# Patient Record
Sex: MALE | Race: WHITE | NOT HISPANIC OR LATINO | URBAN - METROPOLITAN AREA
[De-identification: names, ages, dates, MRNs, and addresses within clinical notes are randomized per-mention and may not be internally consistent; named-entity substitution may affect disease eponyms.]

---

## 2019-04-11 ENCOUNTER — OUTPATIENT (OUTPATIENT)
Dept: OUTPATIENT SERVICES | Facility: HOSPITAL | Age: 24
LOS: 1 days | End: 2019-04-11

## 2019-04-11 VITALS
SYSTOLIC BLOOD PRESSURE: 90 MMHG | HEART RATE: 64 BPM | HEIGHT: 74 IN | WEIGHT: 179.02 LBS | TEMPERATURE: 98 F | DIASTOLIC BLOOD PRESSURE: 56 MMHG | RESPIRATION RATE: 16 BRPM

## 2019-04-11 DIAGNOSIS — S62.001B: ICD-10-CM

## 2019-04-11 DIAGNOSIS — Z98.890 OTHER SPECIFIED POSTPROCEDURAL STATES: Chronic | ICD-10-CM

## 2019-04-11 DIAGNOSIS — S62.001A UNSPECIFIED FRACTURE OF NAVICULAR [SCAPHOID] BONE OF RIGHT WRIST, INITIAL ENCOUNTER FOR CLOSED FRACTURE: ICD-10-CM

## 2019-04-11 LAB
HCT VFR BLD CALC: 35.9 % — LOW (ref 39–50)
HGB BLD-MCNC: 11 G/DL — LOW (ref 13–17)
MCHC RBC-ENTMCNC: 25.1 PG — LOW (ref 27–34)
MCHC RBC-ENTMCNC: 30.6 % — LOW (ref 32–36)
MCV RBC AUTO: 81.8 FL — SIGNIFICANT CHANGE UP (ref 80–100)
NRBC # FLD: 0 K/UL — SIGNIFICANT CHANGE UP (ref 0–0)
PLATELET # BLD AUTO: 262 K/UL — SIGNIFICANT CHANGE UP (ref 150–400)
PMV BLD: 9.7 FL — SIGNIFICANT CHANGE UP (ref 7–13)
RBC # BLD: 4.39 M/UL — SIGNIFICANT CHANGE UP (ref 4.2–5.8)
RBC # FLD: 13.5 % — SIGNIFICANT CHANGE UP (ref 10.3–14.5)
WBC # BLD: 3.52 K/UL — LOW (ref 3.8–10.5)
WBC # FLD AUTO: 3.52 K/UL — LOW (ref 3.8–10.5)

## 2019-04-11 NOTE — H&P PST ADULT - NSANTHOSAYNRD_GEN_A_CORE
No. IVETH screening performed.  STOP BANG Legend: 0-2 = LOW Risk; 3-4 = INTERMEDIATE Risk; 5-8 = HIGH Risk

## 2019-04-11 NOTE — H&P PST ADULT - NSICDXPROBLEM_GEN_ALL_CORE_FT
PROBLEM DIAGNOSES  Problem: Unspecified fracture of navicular [scaphoid] bone of right wrist, initial encounter for closed fracture  Assessment and Plan: Scheduled for operative fixation right scaphoid on 04/15/19. Pre op instructions, famotidine, given and explained. Pt verbalized understanding.

## 2019-04-11 NOTE — H&P PST ADULT - MUSCULOSKELETAL
details… detailed exam decreased ROM due to pain/right forearm splint in place, fingers warm and mobile

## 2019-04-11 NOTE — H&P PST ADULT - HISTORY OF PRESENT ILLNESS
22 y/o  male with no significant PMH presents to PST for pre op evaluation with hx of fracture navicular bone of right wrist sustained while playing soccer on 04/11/19. Now scheduled for operative fixation right scaphoid on 04/15/19

## 2019-04-11 NOTE — H&P PST ADULT - MS GEN HX ROS MEA POS PC
joint pain/knee4 , ankle, right forearm radiating to pinky joint pain/knee, ankle, right forearm radiating to pinky

## 2019-04-11 NOTE — H&P PST ADULT - NEGATIVE CARDIOVASCULAR SYMPTOMS
no dyspnea on exertion/no peripheral edema/no claudication/no palpitations/no paroxysmal nocturnal dyspnea/no chest pain/no orthopnea

## 2019-04-11 NOTE — H&P PST ADULT - NEGATIVE GASTROINTESTINAL SYMPTOMS
no abdominal pain/no diarrhea/no change in bowel habits/no hiccoughs/no nausea/no vomiting/no jaundice/no constipation

## 2019-04-15 ENCOUNTER — OUTPATIENT (OUTPATIENT)
Dept: OUTPATIENT SERVICES | Facility: HOSPITAL | Age: 24
LOS: 1 days | Discharge: HOME | End: 2019-04-15

## 2019-04-15 VITALS
DIASTOLIC BLOOD PRESSURE: 59 MMHG | OXYGEN SATURATION: 100 % | TEMPERATURE: 97 F | HEART RATE: 43 BPM | SYSTOLIC BLOOD PRESSURE: 107 MMHG

## 2019-04-15 VITALS
HEIGHT: 74 IN | RESPIRATION RATE: 16 BRPM | DIASTOLIC BLOOD PRESSURE: 65 MMHG | HEART RATE: 41 BPM | WEIGHT: 179.02 LBS | TEMPERATURE: 98 F | SYSTOLIC BLOOD PRESSURE: 104 MMHG | OXYGEN SATURATION: 100 %

## 2019-04-15 DIAGNOSIS — S62.001A UNSPECIFIED FRACTURE OF NAVICULAR [SCAPHOID] BONE OF RIGHT WRIST, INITIAL ENCOUNTER FOR CLOSED FRACTURE: ICD-10-CM

## 2019-04-15 DIAGNOSIS — Z98.890 OTHER SPECIFIED POSTPROCEDURAL STATES: Chronic | ICD-10-CM

## 2019-04-15 RX ORDER — IBUPROFEN 200 MG
1 TABLET ORAL
Qty: 0 | Refills: 0 | COMMUNITY

## 2019-04-15 RX ORDER — OXYCODONE HYDROCHLORIDE 5 MG/1
1 TABLET ORAL
Qty: 0 | Refills: 0 | COMMUNITY

## 2019-04-15 RX ORDER — ONDANSETRON 8 MG/1
1 TABLET, FILM COATED ORAL
Qty: 12 | Refills: 0 | OUTPATIENT
Start: 2019-04-15 | End: 2019-04-17

## 2019-04-15 RX ORDER — ACETAMINOPHEN 500 MG
2 TABLET ORAL
Qty: 0 | Refills: 0 | COMMUNITY

## 2019-04-15 NOTE — BRIEF OPERATIVE NOTE - NSICDXBRIEFPREOP_GEN_ALL_CORE_FT
PRE-OP DIAGNOSIS:  Traumatic closed fracture of carpal scaphoid with minimal displacement, right, initial encounter 15-Apr-2019 09:41:36  Oswaldo Lauren

## 2019-04-15 NOTE — BRIEF OPERATIVE NOTE - NSICDXBRIEFPOSTOP_GEN_ALL_CORE_FT
POST-OP DIAGNOSIS:  Traumatic closed fracture of carpal scaphoid with minimal displacement, right, initial encounter 15-Apr-2019 09:41:51  Oswaldo Lauren

## 2019-04-15 NOTE — ASU DISCHARGE PLAN (ADULT/PEDIATRIC) - CALL YOUR DOCTOR IF YOU HAVE ANY OF THE FOLLOWING:
see sheet Pain not relieved by Medications/Fever greater than (need to indicate Fahrenheit or Celsius)/Wound/Surgical Site with redness, or foul smelling discharge or pus/Numbness, tingling, color or temperature change to extremity/see sheet

## 2019-04-15 NOTE — ASU DISCHARGE PLAN (ADULT/PEDIATRIC) - NURSING INSTRUCTIONS
Progress to regular diet as tolerated.  Keep well hydrated. Narcotic pain medicine is constipating, buy over the counter stool softener and take as instructed on the bottle

## 2019-04-15 NOTE — ASU DISCHARGE PLAN (ADULT/PEDIATRIC) - CARE PROVIDER_API CALL
Rupert Shay)  Orthopaedic Surgery  [address] [place] [address], [address]  [place] [address]  Phone: [phone]  Fax: [phone]  Follow Up Time:

## 2023-10-03 NOTE — ASU PREOPERATIVE ASSESSMENT, ADULT ([facility]ARK ONLY) - TEACHING/LEARNING CULTURAL CONSIDERATIONS
Provider: DR PAINTER    Caller: HARRISON CHUNG    Relationship to Patient: CARE GIVER    Pharmacy:  [facility] OR [facility] - PLEASE CALL IF SEND ANYTHING TO PHARMACY.     Phone Number: [phone]    Reason for Call: MARLO WISE FOR PT,TO ADVISE HE IS COUGHING UNTIL HE MAKES HIMSELF VOMIT.  PT WAS TAKEN TO E.D. 10/1 X-RAY SHOWED IRREGULARITY AND THE THE COLON BEING SLIGHTLY TWISTED AND THE  CT SCAN SHOWED A LITTLE INFLAMED AT THE BOTTOM. SHE WILL HAVE THEM FAX THE DETAILS FROM THE VISIT TO THE OFFICE AS SOON AS SHE CAN.    When was the patient last seen: 10/1/23    Where is it located: PT WENT TO [facility]    What therapies/medications have you tried: PT DID RECEIVE TWO ANTIBIOTICS    
none